# Patient Record
Sex: MALE | Race: WHITE | NOT HISPANIC OR LATINO | Employment: OTHER | RURAL
[De-identification: names, ages, dates, MRNs, and addresses within clinical notes are randomized per-mention and may not be internally consistent; named-entity substitution may affect disease eponyms.]

---

## 2021-10-09 ENCOUNTER — HOSPITAL ENCOUNTER (EMERGENCY)
Facility: HOSPITAL | Age: 76
Discharge: HOME | End: 2021-10-09
Attending: FAMILY MEDICINE
Payer: MEDICARE

## 2021-10-09 VITALS
HEIGHT: 72 IN | RESPIRATION RATE: 18 BRPM | WEIGHT: 211 LBS | SYSTOLIC BLOOD PRESSURE: 162 MMHG | HEART RATE: 58 BPM | TEMPERATURE: 98 F | BODY MASS INDEX: 28.58 KG/M2 | DIASTOLIC BLOOD PRESSURE: 96 MMHG | OXYGEN SATURATION: 97 %

## 2021-10-09 DIAGNOSIS — S81.811A LACERATION OF RIGHT LOWER EXTREMITY, INITIAL ENCOUNTER: Primary | ICD-10-CM

## 2021-10-09 PROCEDURE — 90715 TDAP VACCINE 7 YRS/> IM: CPT | Performed by: FAMILY MEDICINE

## 2021-10-09 PROCEDURE — 63600175 PHARM REV CODE 636 W HCPCS: Performed by: FAMILY MEDICINE

## 2021-10-09 PROCEDURE — 90471 IMMUNIZATION ADMIN: CPT | Performed by: FAMILY MEDICINE

## 2021-10-09 PROCEDURE — 25000003 PHARM REV CODE 250: Performed by: FAMILY MEDICINE

## 2021-10-09 PROCEDURE — 99284 EMERGENCY DEPT VISIT MOD MDM: CPT | Mod: 25

## 2021-10-09 PROCEDURE — 12004 PR RESUPERF WND BODY 7.6-12.5 CM: ICD-10-PCS | Mod: ,,, | Performed by: FAMILY MEDICINE

## 2021-10-09 PROCEDURE — 12004 RPR S/N/AX/GEN/TRK7.6-12.5CM: CPT | Mod: ,,, | Performed by: FAMILY MEDICINE

## 2021-10-09 PROCEDURE — 99283 PR EMERGENCY DEPT VISIT,LEVEL III: ICD-10-PCS | Mod: 25,,, | Performed by: FAMILY MEDICINE

## 2021-10-09 PROCEDURE — 99283 EMERGENCY DEPT VISIT LOW MDM: CPT | Mod: 25,,, | Performed by: FAMILY MEDICINE

## 2021-10-09 PROCEDURE — 25000003 PHARM REV CODE 250

## 2021-10-09 PROCEDURE — 12034 INTMD RPR S/TR/EXT 7.6-12.5: CPT

## 2021-10-09 RX ORDER — LIDOCAINE HYDROCHLORIDE 10 MG/ML
INJECTION INFILTRATION; PERINEURAL
Status: COMPLETED
Start: 2021-10-09 | End: 2021-10-09

## 2021-10-09 RX ORDER — LIDOCAINE HYDROCHLORIDE AND EPINEPHRINE 10; 10 MG/ML; UG/ML
20 INJECTION, SOLUTION INFILTRATION; PERINEURAL ONCE
Status: DISCONTINUED | OUTPATIENT
Start: 2021-10-09 | End: 2021-10-09

## 2021-10-09 RX ORDER — LIDOCAINE HYDROCHLORIDE 10 MG/ML
5 INJECTION, SOLUTION EPIDURAL; INFILTRATION; INTRACAUDAL; PERINEURAL
Status: DISCONTINUED | OUTPATIENT
Start: 2021-10-09 | End: 2021-10-09 | Stop reason: HOSPADM

## 2021-10-09 RX ORDER — CEPHALEXIN 500 MG/1
500 CAPSULE ORAL EVERY 12 HOURS
Qty: 14 CAPSULE | Refills: 0 | Status: SHIPPED | OUTPATIENT
Start: 2021-10-09 | End: 2021-10-18

## 2021-10-09 RX ADMIN — Medication 1 EACH: at 05:10

## 2021-10-09 RX ADMIN — TETANUS TOXOID, REDUCED DIPHTHERIA TOXOID AND ACELLULAR PERTUSSIS VACCINE, ADSORBED 0.5 ML: 5; 2.5; 8; 8; 2.5 SUSPENSION INTRAMUSCULAR at 03:10

## 2021-10-09 RX ADMIN — LIDOCAINE HYDROCHLORIDE: 10 INJECTION, SOLUTION INFILTRATION; PERINEURAL at 04:10

## 2023-02-02 ENCOUNTER — OFFICE VISIT (OUTPATIENT)
Dept: DERMATOLOGY | Facility: CLINIC | Age: 78
End: 2023-02-02
Payer: MEDICARE

## 2023-02-02 DIAGNOSIS — L57.0 AK (ACTINIC KERATOSIS): ICD-10-CM

## 2023-02-02 DIAGNOSIS — D48.9 NEOPLASM OF UNCERTAIN BEHAVIOR: ICD-10-CM

## 2023-02-02 DIAGNOSIS — Z85.828 HISTORY OF NONMELANOMA SKIN CANCER: ICD-10-CM

## 2023-02-02 DIAGNOSIS — L57.8 OTHER SKIN CHANGES DUE TO CHRONIC EXPOSURE TO NONIONIZING RADIATION: Primary | ICD-10-CM

## 2023-02-02 DIAGNOSIS — L82.1 SK (SEBORRHEIC KERATOSIS): ICD-10-CM

## 2023-02-02 PROCEDURE — 11102 TANGNTL BX SKIN SINGLE LES: CPT | Mod: ,,, | Performed by: DERMATOLOGY

## 2023-02-02 PROCEDURE — 17000 PR DESTRUCTION(LASER SURGERY,CRYOSURGERY,CHEMOSURGERY),PREMALIGNANT LESIONS,FIRST LESION: ICD-10-PCS | Mod: XS,,, | Performed by: DERMATOLOGY

## 2023-02-02 PROCEDURE — 88305 TISSUE EXAM BY PATHOLOGIST: CPT | Mod: TC,SUR | Performed by: DERMATOLOGY

## 2023-02-02 PROCEDURE — 11103 TANGNTL BX SKIN EA SEP/ADDL: CPT | Mod: ,,, | Performed by: DERMATOLOGY

## 2023-02-02 PROCEDURE — 88305 PATHOLOGY, DERMATOLOGY: ICD-10-PCS | Mod: 26,,, | Performed by: PATHOLOGY

## 2023-02-02 PROCEDURE — 17000 DESTRUCT PREMALG LESION: CPT | Mod: XS,,, | Performed by: DERMATOLOGY

## 2023-02-02 PROCEDURE — 11102 PR TANGENTIAL BIOPSY, SKIN, SINGLE LESION: ICD-10-PCS | Mod: ,,, | Performed by: DERMATOLOGY

## 2023-02-02 PROCEDURE — 99203 PR OFFICE/OUTPT VISIT, NEW, LEVL III, 30-44 MIN: ICD-10-PCS | Mod: 25,,, | Performed by: DERMATOLOGY

## 2023-02-02 PROCEDURE — 88305 TISSUE EXAM BY PATHOLOGIST: CPT | Mod: 26,,, | Performed by: PATHOLOGY

## 2023-02-02 PROCEDURE — 99203 OFFICE O/P NEW LOW 30 MIN: CPT | Mod: 25,,, | Performed by: DERMATOLOGY

## 2023-02-02 PROCEDURE — 17003 DESTRUCT PREMALG LES 2-14: CPT | Mod: ,,, | Performed by: DERMATOLOGY

## 2023-02-02 PROCEDURE — 17003 DESTRUCTION, PREMALIGNANT LESIONS; SECOND THROUGH 14 LESIONS: ICD-10-PCS | Mod: ,,, | Performed by: DERMATOLOGY

## 2023-02-02 PROCEDURE — 11103 PR TANGENTIAL BIOPSY, SKIN, EA ADDTL LESION: ICD-10-PCS | Mod: ,,, | Performed by: DERMATOLOGY

## 2023-02-02 NOTE — PROGRESS NOTES
[facility]   Marianela Arias MD    Patient Name: Kleber Montoya  Patient YOB: 1945   Date of Service: 2/2/23    CC: Full Skin Exam    HPI: Kleber Montoya is a 77 y.o. male presents today for a full skin exam.  Patient has not been seen by a dermatologist in the past and dermatologic history includes melanoma. Patient is concerned today about a lesion located on the right forearm.  It has been present for 3 month(s). It has not been treated in the past.     History reviewed. No pertinent past medical history.  Past Surgical History:   Procedure Laterality Date    ABDOMINAL SURGERY       Review of patient's allergies indicates:  No Known Allergies  No current outpatient medications on file.    ROS: A focused review of systems was obtained and negative.     Exam: A full skin exam was performed including scalp, hair, face, neck, chest, back, abdomen, right arm, left arm, right hand, left hand, nails, right leg, and left leg.  All areas examined were normal expect as per below in assessment and plan.  General Appearance of the patient is well developed and well nourished.  Orientation: alert and oriented x 3.  Mood and affect: pleasant.    Assessment:   The primary encounter diagnosis was Other skin changes due to chronic exposure to nonionizing radiation. Diagnoses of SK (seborrheic keratosis), AK (actinic keratosis), Neoplasm of uncertain behavior, and History of nonmelanoma skin cancer were also pertinent to this visit.    Plan:        Seborrheic Keratosis (L82.1)  - Stuck-on, warty, greasy brown papule with pseudo-horn cysts scattered on the trunk and extremities    Plan: Counseling.  I counseled the patient regarding the following:  Skin Care: Seborrheic Keratoses are benign. No treatment is necessary.  Expectations: Seborrheic Keratoses are benign warty growths. Patients get more of them as they age    Plan: Reassurance    Actinic Keratoses(L57.0)  - Erythematous patches and papules with  hyperkeratotic scale distributed on the left arm, face, right ear and right arm.    Plan: Counseling.  I counseled the patient regarding the following:  Skin Care: Sun protective clothing and broad spectrum sunscreen can prevent the formation of Actinic  Keratoses. AKs can resolve with cryotherapy, photodynamic therapy, imiquimod, topical 5-FU.  Expectations: Actinic Keratoses are precancerous proliferations that occur within sun damaged skin. If untreated,  a small subset of AKs can develop into Squamous Cell Carcinoma.  Contact Office if: If AKs fail to resolve despite treatment, or if you develop a side effect from therapy, such as  unbearable crusting, scabbing, redness and tenderness.    Plan: Liquid Nitrogen.  A total of 13 lesions were treated with liquid nitrogen for 2 freeze-thaw cycles lasting 5 seconds, located on the above locations.   The patient's consent was obtained including but not limited to risks of crusting, scabbing,  blistering, scarring, darker or lighter pigmentary change, recurrence, incomplete removal and infection.    Neoplasm of Uncertain Behavior (D48.5)  - Pink nodule located on the right ulnar wrist  Ddx includes: SCC    Plan: Biopsy by Shave Method.  Location (1): Right ulnar wrist  Written consent was obtained and risks were reviewed including but not  limited to scarring, infection, bleeding, scabbing, incomplete removal, nerve damage and allergy to anesthesia.  The area was prepped with Chloraprep. Local anesthesia was obtained with approximately 0.5cc of 1% lidocaine  with epinephrine. A biopsy by shave method to the level of the dermis (sent for H and E) was performed using  a Dermablade on the above location. Aluminum chloride was used for hemostasis. Following the biopsy  Petrolatum and a bandage were applied. Patient will be notified of biopsy results. However, patient instructed to  call the office if not contacted within 2 weeks.    - Pink nodule located on the right radial  forearm  Ddx includes: SCC    Plan: Biopsy by Shave Method.  Location (2): Right radial forearm  Written consent was obtained and risks were reviewed including but not  limited to scarring, infection, bleeding, scabbing, incomplete removal, nerve damage and allergy to anesthesia.  The area was prepped with Chloraprep. Local anesthesia was obtained with approximately 0.5cc of 1% lidocaine  with epinephrine. A biopsy by shave method to the level of the dermis (sent for H and E) was performed using  a Dermablade on the above location. Aluminum chloride was used for hemostasis. Following the biopsy  Petrolatum and a bandage were applied. Patient will be notified of biopsy results. However, patient instructed to  call the office if not contacted within 2 weeks.    Plan: Counseling.  I counseled the patient regarding the following:  Instructions: Neoplasms of Uncertain Behavior can be observed, biopsied or surgically removed depending on the  level of clinical suspicion.  Instructions: Neoplasms of Uncertain Behavior can be observed, biopsied or surgically removed depending on the  level of clinical suspicion.  Contact Office if: patient develops any new lesions that fail to heal, ulcerate or bleed.    History of non-melanoma skin cancer (Z85.828)  - Well healed scar with NER  Associated diagnosis: Medical surveillance following completed treatment    Plan: Monitoring.      Other Skin Changes Due to Chronic Exposure of Nonionizing Radiation (L57.8)    Plan: Monitoring.     Plan: Sunscreen Recommendations.  I recommended a broad spectrum sunscreen with a SPF of 30 or higher. I explained that SPF 30 sunscreens block approximately 97 percent of the  sun's harmful rays. Sunscreens should be applied at least 15 minutes prior to expected sun exposure and then every 2 hours after that as long as  sun exposure continues. If swimming or exercising sunscreen should be reapplied every 45 minutes to an hour after getting wet or  sweating. One  ounce, or the equivalent of a shot glass full of sunscreen, is adequate to protect the skin not covered by a bathing suit. I also recommended a lip  balm with a sunscreen as well. Sun protective clothing can be used in lieu of sunscreen but must be worn the entire time you are exposed to the  sun's rays.      Follow up in about 6 months (around 8/2/2023) for FSE.    Marianela Arias MD

## 2023-02-06 LAB
ESTROGEN SERPL-MCNC: NORMAL PG/ML
INSULIN SERPL-ACNC: NORMAL U[IU]/ML
LAB AP GROSS DESCRIPTION: NORMAL
LAB AP LABORATORY NOTES: NORMAL
LAB AP SPEC A DDX: NORMAL
LAB AP SPEC A MORPHOLOGY: NORMAL
LAB AP SPEC A PROCEDURE: NORMAL
LAB AP SPEC B DDX: NORMAL
LAB AP SPEC B MORPHOLOGY: NORMAL
LAB AP SPEC B PROCEDURE: NORMAL
T3RU NFR SERPL: NORMAL %

## 2023-02-17 NOTE — PATIENT INSTRUCTIONS
Excision Site Care  Starting tomorrow you may shower and wash the area with dial antibacterial soap  Pat dry and apply vaseline and a bandaid  Perform this routine for three days   May leave unbandaged after these three days  The area will be irritated, sore, slightly red, and may itch, sting, or burn  Do not apply make-up to the area until it is healed  There will be a scar  The area will take 1-2 weeks to heal  No swimming or soaking in the tub or hot tub until stitches are removed  Tylenol is primarily recommended for pain  If pain is uncontrolled with Tylenol then patient may use Advil, Ibuprofen, Aleve, Motrin, or Aspirin

## 2023-02-21 ENCOUNTER — PROCEDURE VISIT (OUTPATIENT)
Dept: DERMATOLOGY | Facility: CLINIC | Age: 78
End: 2023-02-21
Payer: MEDICARE

## 2023-02-21 VITALS
SYSTOLIC BLOOD PRESSURE: 134 MMHG | HEIGHT: 72 IN | DIASTOLIC BLOOD PRESSURE: 78 MMHG | HEART RATE: 70 BPM | BODY MASS INDEX: 28.58 KG/M2 | WEIGHT: 211 LBS

## 2023-02-21 DIAGNOSIS — C44.92 SCC (SQUAMOUS CELL CARCINOMA): Primary | ICD-10-CM

## 2023-02-21 PROCEDURE — 88305 TISSUE EXAM BY PATHOLOGIST: CPT | Mod: 26,,, | Performed by: PATHOLOGY

## 2023-02-21 PROCEDURE — 11602 EXC TR-EXT MAL+MARG 1.1-2 CM: CPT | Mod: ,,, | Performed by: DERMATOLOGY

## 2023-02-21 PROCEDURE — 11602 PR EXC SKIN MALIG 1.1-2 CM TRUNK,ARM,LEG: ICD-10-PCS | Mod: ,,, | Performed by: DERMATOLOGY

## 2023-02-21 PROCEDURE — 88305 PATHOLOGY, DERMATOLOGY: ICD-10-PCS | Mod: 26,,, | Performed by: PATHOLOGY

## 2023-02-21 PROCEDURE — 12034 PR LAYR CLOS WND TRUNK,ARM,LEG 7.6-12.5 CM: ICD-10-PCS | Mod: 51,,, | Performed by: DERMATOLOGY

## 2023-02-21 PROCEDURE — 99499 UNLISTED E&M SERVICE: CPT | Mod: ,,, | Performed by: DERMATOLOGY

## 2023-02-21 PROCEDURE — 11603 EXC TR-EXT MAL+MARG 2.1-3 CM: CPT | Mod: ,,, | Performed by: DERMATOLOGY

## 2023-02-21 PROCEDURE — 12034 INTMD RPR S/TR/EXT 7.6-12.5: CPT | Mod: 51,,, | Performed by: DERMATOLOGY

## 2023-02-21 PROCEDURE — 11603 PR EXC SKIN MALIG 2.1-3 CM TRUNK,ARM,LEG: ICD-10-PCS | Mod: ,,, | Performed by: DERMATOLOGY

## 2023-02-21 PROCEDURE — 88305 TISSUE EXAM BY PATHOLOGIST: CPT | Mod: TC,59,SUR | Performed by: DERMATOLOGY

## 2023-02-21 PROCEDURE — 99499 NO LOS: ICD-10-PCS | Mod: ,,, | Performed by: DERMATOLOGY

## 2023-02-21 NOTE — PROGRESS NOTES
[facility]   Marianela Arias MD    Patient Name: Kleber Montoya  Patient YOB: 1945   Date of Service: 2/21/23    CC: Excision    HPI: Kleber Montoya is a 77 y.o. male here today for excision of SCC, located on the right ulnar wrist and right radial forearm.      History reviewed. No pertinent past medical history.  Past Surgical History:   Procedure Laterality Date    ABDOMINAL SURGERY       Review of patient's allergies indicates:  No Known Allergies  No current outpatient medications on file.    ROS: A focused review of systems was obtained and negative.     Exam: A focused skin exam was performed and the biopsy site was identified.  General Appearance of the patient is well developed and well nourished.  Orientation: alert and oriented x 3.  Mood and affect: pleasant.    Vitals:    02/21/23 0812   BP: 134/78   Pulse: 70       Assessment:   The encounter diagnosis was SCC (squamous cell carcinoma).    Plan:  Excision  Accession Number: [id number]  Biopsy Photograph Reviewed: Yes    Procedure Note    Location (A): right ulnar wrist  Preop Size: 1.4 x 1.4 cm  Margin: 0.4 cm  Total Excised Diameter: 2.2 cm x 2.2 cm  Procedure: Excision - Elliptical  Anesthesia: local infiltration-1% lidocaine with epinephrine (12 cc)  Estimated Blood Loss: minimal  Complications: none    Repair Type: Intermediate  Repair Length: 4.5 cm    Consent was obtained from the patient. The risks and benefits to therapy were discussed in detail. Specifically, the risks of infection, scarring, bleeding, prolonged wound healing, incomplete removal, allergy to anesthesia, nerve injury and recurrence were addressed. Prior to the procedure, the treatment site was clearly identified and confirmed by the patient. All components of Universal Protocol/PAUSE Rule completed.    Procedure: The patient was placed in a comfortable position exposing the surgical site. The area was prepped with Hibiclens and draped in the usual fashion.  An elliptical excision was performed, on the above listed location The margin was drawn around the clinically apparent lesion. An elliptical shape was then drawn on the skin incorporating the lesion and margins. Incisions were then made along these lines to the appropriate tissue plane and the lesion was extirpated. A 15 blade was used. The lesion was excised to the layer of the adipose tissue, removed and sent to pathology for processing and histologic evaluation.    Intermediate layered repair was performed because closure of the subcutaneous tissue and superficial non-muscular fascia was required, because damaged skin surrounding defect made closure difficult, because extensive undermining required, and because Burow's triangles were required. Undermining was performed with blunt dissection. Hemostasis was achieved with electrocautery. The subcutaneous tissue and dermis were closed with 3-0 Vicryl (simple interrupted). Epidermal closure was achieved with 4-0 Ethilon(simple interrupted). Petrolatum + dry sterile dressing were applied. I reviewed with the patient in detail post-care instructions. Patient is not to engage in any heavy lifting, exercise, or swimming for the next 14 days. Should the patient develop any fevers, chills, bleeding, severe pain patient will contact the office immediately. Suture removal in 10.    Location (B): right radial forearm  Preop Size: 1.1 cm x 1.2 cm  Margin: 0.4 cm  Total Excised Diameter: 1.9 x 2.0 cm  Procedure: Excision - Elliptical  Anesthesia: local infiltration-1% lidocaine with epinephrine (12 cc)  Estimated Blood Loss: minimal  Complications: none    Repair Type: Intermediate  Repair Length: 4.5 cm    Consent was obtained from the patient. The risks and benefits to therapy were discussed in detail. Specifically, the risks of infection, scarring, bleeding, prolonged wound healing, incomplete removal, allergy to anesthesia, nerve injury and recurrence were addressed. Prior  to the procedure, the treatment site was clearly identified and confirmed by the patient. All components of Universal Protocol/PAUSE Rule completed.    Procedure: The patient was placed in a comfortable position exposing the surgical site. The area was prepped with Hibiclens and draped in the usual fashion. An elliptical excision was performed, on the above listed location The margin was drawn around the clinically apparent lesion. An elliptical shape was then drawn on the skin incorporating the lesion and margins. Incisions were then made along these lines to the appropriate tissue plane and the lesion was extirpated. A 15 blade was used. The lesion was excised to the layer of the adipose tissue, removed and sent to pathology for processing and histologic evaluation.    Intermediate layered repair was performed because closure of the subcutaneous tissue and superficial non-muscular fascia was required, because damaged skin surrounding defect made closure difficult, because extensive undermining required, and because Burow's triangles were required. Undermining was performed with blunt dissection. Hemostasis was achieved with electrocautery. The subcutaneous tissue and dermis were closed with 3-0 Vicryl (simple interrupted). Epidermal closure was achieved with 4-0 Vicryl(simple interrupted). Petrolatum + dry sterile dressing were applied. I reviewed with the patient in detail post-care instructions. Patient is not to engage in any heavy lifting, exercise, or swimming for the next 14 days. Should the patient develop any fevers, chills, bleeding, severe pain patient will contact the office immediately. Suture removal in 10.         Follow up in about 10 days (around 3/3/2023) for Suture Removal.    Marianela Arias MD

## 2023-03-03 ENCOUNTER — CLINICAL SUPPORT (OUTPATIENT)
Dept: DERMATOLOGY | Facility: CLINIC | Age: 78
End: 2023-03-03
Payer: MEDICARE

## 2023-03-03 DIAGNOSIS — Z48.02 ENCOUNTER FOR REMOVAL OF SUTURES: Primary | ICD-10-CM

## 2023-03-03 PROCEDURE — 87077 CULTURE, WOUND: ICD-10-PCS | Mod: ,,, | Performed by: CLINICAL MEDICAL LABORATORY

## 2023-03-03 PROCEDURE — 87070 CULTURE, WOUND: ICD-10-PCS | Mod: ,,, | Performed by: CLINICAL MEDICAL LABORATORY

## 2023-03-03 PROCEDURE — 87070 CULTURE OTHR SPECIMN AEROBIC: CPT | Mod: ,,, | Performed by: CLINICAL MEDICAL LABORATORY

## 2023-03-03 PROCEDURE — 87077 CULTURE AEROBIC IDENTIFY: CPT | Mod: ,,, | Performed by: CLINICAL MEDICAL LABORATORY

## 2023-03-03 NOTE — PROGRESS NOTES
[facility]   Marianela Arias MD    Patient Name: Kleber Montoya  Patient YOB: 1945   Date of Service: 3/3/23    CC: Suture removal    HPI: Kleber Montoya is a 77 y.o. male here today for suture removal on the right ulnar wrist and right radial forearm.  The area is healing well.  Patient denies fever, chills, puss, or redness to the area.    No past medical history on file.  Past Surgical History:   Procedure Laterality Date    ABDOMINAL SURGERY       Review of patient's allergies indicates:  No Known Allergies  No current outpatient medications on file.      Exam: A focused skin exam was performed. All areas examined were normal except as mentioned in the assessment and plan below.  General Appearance of the patient is well developed and well nourished.  Orientation: alert and oriented x 3.  Mood and affect: pleasant.    Assessment:   There were no encounter diagnoses.    Plan:   Suture Removal (Global Period)  Body Locations: right ulnar wrist and right radial forearm  I reviewed the pathology results with the patient in detail.  The examination of the site was clean, dry and intact. Sutures were removed.  Vaseline applied.    I counseled the patient regarding the following:  Expectations: Sutured wounds tend to have 50% of the strength of normal skin at the time of suture removal. Try avoiding rigorous exercise or lifting greater than 10 pounds.  Contact office if: you develop pain, redness, tenderness or pus at the surgical site.    A culture was obtained from the area           Follow up if symptoms worsen or fail to improve, for FSE already scheduled.    Radha Moffett RN

## 2023-03-05 LAB — MICROORGANISM SPEC CULT: ABNORMAL

## 2023-03-06 DIAGNOSIS — L08.9 SKIN INFECTION: Primary | ICD-10-CM

## 2023-03-06 RX ORDER — CEPHALEXIN 500 MG/1
500 CAPSULE ORAL 3 TIMES DAILY
Qty: 21 CAPSULE | Refills: 0 | Status: SHIPPED | OUTPATIENT
Start: 2023-03-06 | End: 2023-03-13

## 2023-08-02 ENCOUNTER — OFFICE VISIT (OUTPATIENT)
Dept: DERMATOLOGY | Facility: CLINIC | Age: 78
End: 2023-08-02
Payer: MEDICARE

## 2023-08-02 DIAGNOSIS — L82.1 SK (SEBORRHEIC KERATOSIS): ICD-10-CM

## 2023-08-02 DIAGNOSIS — Z85.820 HISTORY OF MELANOMA: ICD-10-CM

## 2023-08-02 DIAGNOSIS — L57.0 AK (ACTINIC KERATOSIS): ICD-10-CM

## 2023-08-02 DIAGNOSIS — D48.9 NEOPLASM OF UNCERTAIN BEHAVIOR: ICD-10-CM

## 2023-08-02 DIAGNOSIS — L57.8 OTHER SKIN CHANGES DUE TO CHRONIC EXPOSURE TO NONIONIZING RADIATION: Primary | ICD-10-CM

## 2023-08-02 DIAGNOSIS — Z85.828 HISTORY OF NONMELANOMA SKIN CANCER: ICD-10-CM

## 2023-08-02 PROCEDURE — 11102 PR TANGENTIAL BIOPSY, SKIN, SINGLE LESION: ICD-10-PCS | Mod: ,,, | Performed by: DERMATOLOGY

## 2023-08-02 PROCEDURE — 11102 TANGNTL BX SKIN SINGLE LES: CPT | Mod: ,,, | Performed by: DERMATOLOGY

## 2023-08-02 PROCEDURE — 99213 OFFICE O/P EST LOW 20 MIN: CPT | Mod: 25,,, | Performed by: DERMATOLOGY

## 2023-08-02 PROCEDURE — 17003 DESTRUCTION, PREMALIGNANT LESIONS; SECOND THROUGH 14 LESIONS: ICD-10-PCS | Mod: ,,, | Performed by: DERMATOLOGY

## 2023-08-02 PROCEDURE — 17000 DESTRUCT PREMALG LESION: CPT | Mod: XS,,, | Performed by: DERMATOLOGY

## 2023-08-02 PROCEDURE — 88305 TISSUE EXAM BY PATHOLOGIST: CPT | Mod: TC,SUR | Performed by: DERMATOLOGY

## 2023-08-02 PROCEDURE — 88305 PATHOLOGY, DERMATOLOGY: ICD-10-PCS | Mod: 26,,, | Performed by: PATHOLOGY

## 2023-08-02 PROCEDURE — 17000 PR DESTRUCTION(LASER SURGERY,CRYOSURGERY,CHEMOSURGERY),PREMALIGNANT LESIONS,FIRST LESION: ICD-10-PCS | Mod: XS,,, | Performed by: DERMATOLOGY

## 2023-08-02 PROCEDURE — 17003 DESTRUCT PREMALG LES 2-14: CPT | Mod: ,,, | Performed by: DERMATOLOGY

## 2023-08-02 PROCEDURE — 88305 TISSUE EXAM BY PATHOLOGIST: CPT | Mod: 26,,, | Performed by: PATHOLOGY

## 2023-08-02 PROCEDURE — 99213 PR OFFICE/OUTPT VISIT, EST, LEVL III, 20-29 MIN: ICD-10-PCS | Mod: 25,,, | Performed by: DERMATOLOGY

## 2023-08-02 NOTE — PROGRESS NOTES
[facility]   Marianela Arias MD    Patient Name: Kleber Montoya  Patient YOB: 1945   Date of Service: 8/2/23    CC: Full Skin Exam    HPI: Kleber Montoya is a 78 y.o. male presents today for a full skin exam.  Patient was last seen 02/23 and dermatologic history includes Melanoma, NMSC, and AK. Patient is concerned today about a lesion located on the nasal tip.  It has been present for 3 month(s). It has not been treated in the past.      History reviewed. No pertinent past medical history.  Past Surgical History:   Procedure Laterality Date    ABDOMINAL SURGERY       Review of patient's allergies indicates:  No Known Allergies  No current outpatient medications on file.    ROS: A focused review of systems was obtained and negative.     Exam: A full skin exam was performed including scalp, hair, face, neck, chest, back, abdomen, right arm, left arm, right hand, left hand, nails, right leg, and left leg.  All areas examined were normal expect as per below in assessment and plan.  General Appearance of the patient is well developed and well nourished.  Orientation: alert and oriented x 3.  Mood and affect: pleasant.    Assessment:   The primary encounter diagnosis was Other skin changes due to chronic exposure to nonionizing radiation. Diagnoses of SK (seborrheic keratosis), AK (actinic keratosis), Neoplasm of uncertain behavior, History of nonmelanoma skin cancer, and History of melanoma were also pertinent to this visit.    Plan:        Seborrheic Keratosis (L82.1)  - Stuck-on, warty, greasy brown papule with pseudo-horn cysts scattered on the trunk and extremities    Plan: Counseling.  I counseled the patient regarding the following:  Skin Care: Seborrheic Keratoses are benign. No treatment is necessary.  Expectations: Seborrheic Keratoses are benign warty growths. Patients get more of them as they age    Plan: Reassurance      Actinic Keratoses(L57.0)  - Erythematous patches and papules with  hyperkeratotic scale distributed on the right dorsal hand and right arm.    Plan: Counseling.  I counseled the patient regarding the following:  Skin Care: Sun protective clothing and broad spectrum sunscreen can prevent the formation of Actinic  Keratoses. AKs can resolve with cryotherapy, photodynamic therapy, imiquimod, topical 5-FU.  Expectations: Actinic Keratoses are precancerous proliferations that occur within sun damaged skin. If untreated,  a small subset of AKs can develop into Squamous Cell Carcinoma.  Contact Office if: If AKs fail to resolve despite treatment, or if you develop a side effect from therapy, such as  unbearable crusting, scabbing, redness and tenderness.    Plan: Liquid Nitrogen.  A total of 4 lesions were treated with liquid nitrogen for 2 freeze-thaw cycles lasting 5 seconds, located on the above locations.   The patient's consent was obtained including but not limited to risks of crusting, scabbing,  blistering, scarring, darker or lighter pigmentary change, recurrence, incomplete removal and infection.    Neoplasm of Uncertain Behavior (D48.5)  - Pearly papule located on the right nasal tip  Ddx includes: BCC    Plan: Counseling.  I counseled the patient regarding the following:  Instructions: Neoplasms of Uncertain Behavior can be observed, biopsied or surgically removed depending on the  level of clinical suspicion.  Instructions: Neoplasms of Uncertain Behavior can be observed, biopsied or surgically removed depending on the  level of clinical suspicion.  Contact Office if: patient develops any new lesions that fail to heal, ulcerate or bleed.    Plan: Biopsy by Shave Method.  Location (1): Right nasal tip  Written consent was obtained and risks were reviewed including but not  limited to scarring, infection, bleeding, scabbing, incomplete removal, nerve damage and allergy to anesthesia.  The area was prepped with Chloraprep. Local anesthesia was obtained with approximately 0.5cc  of 1% lidocaine  with epinephrine. A biopsy by shave method to the level of the dermis (sent for H and E) was performed using  a Dermablade on the above location. Aluminum chloride was used for hemostasis. Following the biopsy  Petrolatum and a bandage were applied. Patient will be notified of biopsy results. However, patient instructed to  call the office if not contacted within 2 weeks.      History of non-melanoma skin cancer (Z85.828)  - Well healed scar with NER  Associated diagnosis: Medical surveillance following completed treatment    Plan: Monitoring.    History of malignant melanoma  - well healed scar with NER  Associated diagnosis: Medical surveillance following completed treatment    Plan: Counseling  I counseled the patient regarding the following:  Skin Care: Patients with a history of melanoma should wear broad spectrum sunscreen and sun protective clothing.  Expectations: Scars from excisional sites of melanoma should be monitored for any recurrences. Monthly self-skin checks should be performed to monitor for any moles that change in size, shape or color, itch burn or bleed.  Contact Office if: Patient notices any new or changing moles, develops constitutional symptoms or develops new lesions within or around the previous melanoma scar.      Other Skin Changes Due to Chronic Exposure of Nonionizing Radiation (L57.8)    Plan: Monitoring.     Plan: Sunscreen Recommendations.  I recommended a broad spectrum sunscreen with a SPF of 30 or higher. I explained that SPF 30 sunscreens block approximately 97 percent of the  sun's harmful rays. Sunscreens should be applied at least 15 minutes prior to expected sun exposure and then every 2 hours after that as long as  sun exposure continues. If swimming or exercising sunscreen should be reapplied every 45 minutes to an hour after getting wet or sweating. One  ounce, or the equivalent of a shot glass full of sunscreen, is adequate to protect the skin not  covered by a bathing suit. I also recommended a lip  balm with a sunscreen as well. Sun protective clothing can be used in lieu of sunscreen but must be worn the entire time you are exposed to the  sun's rays.      Follow up in about 6 months (around 2/2/2024) for FSE.    Marianela Arias MD

## 2023-08-02 NOTE — PATIENT INSTRUCTIONS
Sunscreen Recommendations  I recommended a broad spectrum sunscreen with a SPF of 30 or higher that is water-resistant. SPF 30 sunscreens block approximately 97 percent of the sun's harmful rays.   Sunscreens should be applied at least 15 minutes prior to expected sun exposure and then every 90 minutes after that as long as sun exposure continues.   If swimming or exercising sunscreen should be reapplied every 45 minutes to an hour after getting wet or sweating.  One ounce, or the equivalent of a shot glass full of sunscreen, is adequate to protect the skin not covered by a bathing suit.   I also recommended a lip balm with a sunscreen as well.   Healthy Sun Protective Behaviors  Sun protective clothing can be used in lieu of sunscreen but must be worn the entire time you are exposed to the sun's rays.  Seek shade between 10 a.m. and 2 p.m.  Use extra caution near water, snow, or sand as they reflect sun rays  Avoid tanning beds and consider sunless self-tanning products instead  Perform monthly self skin exams       Cryotherapy  There will likely be a blister.   Clean the area daily with dial antibacterial soap and water.   Apply vaseline as needed.   The area will take 1-2 weeks to heal.      Biopsy Site Care  Starting tomorrow you may shower and wash the area with dial antibacterial soap  Pat dry and apply vaseline and a bandaid  Perform this routine for three days  The area will be irritated, sore, slightly red, and may itch, sting, or burn  Do not apply make-up to the area until it is healed  There will be a scar  The area will take 1-2 weeks to heal  No soaking in the tub or hot tub for one week

## 2023-08-04 LAB
ESTROGEN SERPL-MCNC: NORMAL PG/ML
INSULIN SERPL-ACNC: NORMAL U[IU]/ML
LAB AP GROSS DESCRIPTION: NORMAL
LAB AP LABORATORY NOTES: NORMAL
LAB AP SPEC A DDX: NORMAL
LAB AP SPEC A MORPHOLOGY: NORMAL
LAB AP SPEC A PROCEDURE: NORMAL
T3RU NFR SERPL: NORMAL %

## 2023-08-30 ENCOUNTER — PROCEDURE VISIT (OUTPATIENT)
Dept: DERMATOLOGY | Facility: CLINIC | Age: 78
End: 2023-08-30
Payer: MEDICARE

## 2023-08-30 VITALS — DIASTOLIC BLOOD PRESSURE: 74 MMHG | SYSTOLIC BLOOD PRESSURE: 131 MMHG | HEART RATE: 65 BPM

## 2023-08-30 DIAGNOSIS — C44.311 BASAL CELL CARCINOMA (BCC) OF RIGHT NASAL TIP: Primary | ICD-10-CM

## 2023-08-30 PROCEDURE — 13152 CMPLX RPR E/N/E/L 2.6-7.5 CM: CPT | Mod: 51,,, | Performed by: STUDENT IN AN ORGANIZED HEALTH CARE EDUCATION/TRAINING PROGRAM

## 2023-08-30 PROCEDURE — 17311 MOHS 1 STAGE H/N/HF/G: CPT | Mod: ,,, | Performed by: STUDENT IN AN ORGANIZED HEALTH CARE EDUCATION/TRAINING PROGRAM

## 2023-08-30 PROCEDURE — 99499 NO LOS: ICD-10-PCS | Mod: ,,, | Performed by: STUDENT IN AN ORGANIZED HEALTH CARE EDUCATION/TRAINING PROGRAM

## 2023-08-30 PROCEDURE — 99499 UNLISTED E&M SERVICE: CPT | Mod: ,,, | Performed by: STUDENT IN AN ORGANIZED HEALTH CARE EDUCATION/TRAINING PROGRAM

## 2023-08-30 PROCEDURE — 13152 PR RECMPL WND LID,NOS,EAR 2.6-7.5 CM: ICD-10-PCS | Mod: 51,,, | Performed by: STUDENT IN AN ORGANIZED HEALTH CARE EDUCATION/TRAINING PROGRAM

## 2023-08-30 PROCEDURE — 17311: ICD-10-PCS | Mod: ,,, | Performed by: STUDENT IN AN ORGANIZED HEALTH CARE EDUCATION/TRAINING PROGRAM

## 2023-08-30 RX ORDER — MUPIROCIN 20 MG/G
OINTMENT TOPICAL DAILY
Qty: 22 G | Refills: 5 | Status: SHIPPED | OUTPATIENT
Start: 2023-08-30

## 2023-08-30 NOTE — PATIENT INSTRUCTIONS

## 2023-08-30 NOTE — PROGRESS NOTES
Mohs Surgery Consult Note    Kleber Montoya is a 78 y.o. male who is referred by Dr. Arias for evaluation of a BCC on the right nasal tip.     Recurrent skin cancer: No    Preoperative Risk Factors:  Current Anticoagulants: No  Endocarditis / Rheumatic Fever hx: No  Immunocompromised: No  Prosthetic joint: No  Congenital heart defect: No  Prosthetic heart valve: No  Diabetic: No  Transplant: No  Pacemaker: No  Defibrillator:  No  Prior problem with local anesthesia: No  Tobacco History: No]  Clindamycin Allergy: No  Pregnant: no     Transmissible Diseases:  HIV No  Hepatitis B or C  No      Exam:  Limited skin exam is normal except for a  BCC  located on the right nasal tip  .    Pathologic Findings:  Accession # [id number]  Diagnosis: BCC    Assessment and Plan:  Treatment Options : The various treatment options for skin cancer removal were reviewed with the patient in detail. These include Mohs surgery with its high cure rate, excisional surgery, destructive treatment, radiation therapy, and various topical therapies.  Given the indications and high cure rate, the patient has agreed to proceed with Mohs.  Risks and Benefits : The rationale for Mohs was explained to the patient. The risks and benefits to therapy were discussed in detail. Specifically, the risks of infection, scarring, bleeding, dehiscence, hematoma, prolonged wound healing, incomplete removal, allergy to anesthesia, nerve injury, inability to clear the tumor, and recurrence were addressed. The treatment site was clearly identified and confirmed by the patient.    Plan:  Mohs Micrographic Surgery    Indication for Mohs: Clinical area critical for tissue conservation (Area H: central face, eyelids, eyebrows, nose, lips, chin, ear, periauricular, temple, genitalia, hands, feet, ankles, nail units and areola)  Mohs AUC Score: 9   Proposed closure: Linear   Indication for antibiotics: Mupirocin ointment bid x 7 days       Leif Bowers MD   Mohs  Surgery/Dermatologic Oncology

## 2023-08-30 NOTE — PROGRESS NOTES
Mohs Surgery Operative Note    Patient name: Kleber Montoya  Date: 08/30/2023    Mohs accession #:   Previous dermpath accession #: [id number]  Location: Right nasal tip  Preop diagnosis:BCC  Postop diagnosis: Same  Mohs AUC score: 9  Number of stages: 1  Preop size: 0.5 x 0.5 cm   Postop size: 0.9 x 0.9 cm   Depth of defect: fat  Repair type: complex    Surgeon and Pathologist: MARCO A Bowers MD     Indications for Mohs Surgery    Removal of the patient's tumor is complicated by the following clinical features: Clinical area critical for tissue conservation (Area H: central face, eyelids, eyebrows, nose, lips, chin, ear, periauricular, temple, genitalia, hands, feet, ankles, nail units and areola) and Poorly-defined clinical tumor borders    Based on my medical judgement, Mohs surgery is the most appropriate treatment for this cancer compared to other treatments. I discussed alternative treatments to Mohs surgery and specifically discussed the risks and benefits of curettage, excision with permanent sections, and foregoing treatment. The rationale for Mohs was explained to the patient and consent was obtained. The risks, benefits and alternatives to therapy were discussed in detail. Specifically, the risks of infection, scarring, bleeding, prolonged wound healing, incomplete removal, allergy to anesthesia, nerve injury and recurrence were addressed. Prior to the procedure, the treatment site was clearly identified and confirmed by the patient. All components of Universal Protocol/PAUSE Rule completed. ELVIS Bowers MD operated in two distinct and integrated capacities as the surgeon and pathologist.    STAGE I:  The patient was placed on the operating table. The cancer was identified and outlined. The entire surgical field was prepped with chlorhexidine The surgical site was anesthetized using Lidocaine 1% with epinephrine 1:100,000 buffered with sodium bicarbonate 8.4% in a 1:10 ratio.    The area of  clinically apparent tumor was debulked with a 2 mm curette. The layer of tissue was then surgically excised using a #15 blade and was then transferred onto a specimen sheet maintaining the orientation of the specimen. Hemostasis was obtained using monopolar electrodesiccation. The wound site was then covered with a dressing while the tissue samples were processed for examination.    The specimen was oriented, mapped and divided. Each section was then inked and processed in the Mohs lab using the Mohs protocol and submitted for frozen section. The histopathologic sections were reviewed by the surgeon in conjunction with the reference map.     Total blocks: 1 Total slides: 2    Frozen section analysis revealed: No additional tumor identified on microscopic examination by the surgeon. Histology: No malignant cells seen in the sections examined.    Additional Histologic Findings: none     REPAIR: Complex Closure    Primary Surgeon : MARCO A Bowers MD   Repair Size: 2.6 cm  Sutures:  4-0 monocryl; 5-0 prolene   Width of underminin cm   Free margin involved: no  Presence of exposed bone/cartilage/tendon/named neurovascular structure: no  Use of retention sutures: No  Indication for complex repair: Wide undermining at least the width of the defect on one side needed to facilitate a lower tension closure     The defect was identified and a marking pen was used to plan the repair. The area was infiltrated with Lidocaine 1% with epinephrine 1:100,000 buffered with sodium bicarbonate 8.4% in a 1:10 ratio, prepped with chlorhexidine and draped with sterile towels.  The wound was debeveled and undermined widely to the width listed as above. Cones were excised within relaxed skin tension lines on both sides of the defect. Hemostasis was obtained using monopolar electrodesiccation. The dermis and subcutaneous tissue were then approximated using buried vertical mattress sutures. Percutaneous simple running sutures were  carefully placed for maximum eversion and meticulous wound edge approximation. Careful attention was paid to avoid distorting any nearby free margins.    The wound was cleansed with saline and ointment was applied along the wound surface. A sterile pressure dressing was applied. Wound care instructions were given verbally and in writing. The patient left the operating suite in stable condition. Patient was informed that additional refinement of the resulting surgical scar may be used as a second stage of this reconstruction.    Leif Bowers MD   Mohs Surgery, Dermatologic Oncology

## 2023-09-06 ENCOUNTER — CLINICAL SUPPORT (OUTPATIENT)
Dept: DERMATOLOGY | Facility: CLINIC | Age: 78
End: 2023-09-06
Payer: MEDICARE

## 2023-09-06 DIAGNOSIS — Z48.02 VISIT FOR SUTURE REMOVAL: Primary | ICD-10-CM

## 2023-09-06 PROCEDURE — 99024 PR POST-OP FOLLOW-UP VISIT: ICD-10-PCS | Mod: ,,, | Performed by: STUDENT IN AN ORGANIZED HEALTH CARE EDUCATION/TRAINING PROGRAM

## 2023-09-06 PROCEDURE — 99024 POSTOP FOLLOW-UP VISIT: CPT | Mod: ,,, | Performed by: STUDENT IN AN ORGANIZED HEALTH CARE EDUCATION/TRAINING PROGRAM

## 2023-09-06 NOTE — PROGRESS NOTES
Patient name: Kleber Montoay  Date: 08/30/2023     Mohs accession #:   Previous dermpath accession #: [id number]  Location: Right nasal tip  Preop diagnosis:BCC  Postop diagnosis: Same  Mohs AUC score: 9  Number of stages: 1  Preop size: 0.5 x 0.5 cm   Postop size: 0.9 x 0.9 cm   Depth of defect: fat  Repair type: complex    Patient is here for SR incision is healing well no s/s of infection noted patient denies pain will RTC in October for wound check      Avey'On SACHA Caro/IVC

## 2023-10-02 ENCOUNTER — CLINICAL SUPPORT (OUTPATIENT)
Dept: DERMATOLOGY | Facility: CLINIC | Age: 78
End: 2023-10-02
Payer: MEDICARE

## 2023-10-02 DIAGNOSIS — Z51.89 VISIT FOR WOUND CHECK: Primary | ICD-10-CM

## 2023-10-02 NOTE — PROGRESS NOTES
Mohs accession #:   Previous dermpath accession #: [id number]  Location: Right nasal tip  Preop diagnosis:BCC  Postop diagnosis: Same  Mohs AUC score: 9  Number of stages: 1  Preop size: 0.5 x 0.5 cm   Postop size: 0.9 x 0.9 cm   Depth of defect: fat  Repair type: complex    Patient is here for 6 week wound check. Incision is healing well. Will do derma sanding to nasal tip today.    The area was cleaned with hibiclens and locally infiltrated with 1% lidocaine with epinephrine, ~3 cc total. The area was dermabraded with sterilized 3M medium grit drywall sandpaper until pinpoint bleeding occurred.  The patient tolerated the procedure well without complications. Verbal and written wound care instructions were given.     RTC in 7 weeks for wound check.        Nik'On SACHA Caro/IVC

## 2023-11-20 ENCOUNTER — CLINICAL SUPPORT (OUTPATIENT)
Dept: DERMATOLOGY | Facility: CLINIC | Age: 78
End: 2023-11-20
Payer: MEDICARE

## 2023-11-20 DIAGNOSIS — Z51.89 VISIT FOR WOUND CHECK: Primary | ICD-10-CM

## 2023-11-20 NOTE — PROGRESS NOTES
Mohs accession #:   Previous dermpath accession #: [id number]  Location: Right nasal tip  Preop diagnosis:BCC  Postop diagnosis: Same  Mohs AUC score: 9  Number of stages: 1  Preop size: 0.5 x 0.5 cm   Postop size: 0.9 x 0.9 cm   Depth of defect: fat  Repair type: complex    Patient is her for wd ck s/p derma sanding. Scar has healed well. No complaints noted. RTC with Dr. Arias for FSE.         Nik'On SACHA Caro/IVC

## 2024-02-05 ENCOUNTER — OFFICE VISIT (OUTPATIENT)
Dept: DERMATOLOGY | Facility: CLINIC | Age: 79
End: 2024-02-05
Payer: MEDICARE

## 2024-02-05 VITALS — HEIGHT: 72 IN | BODY MASS INDEX: 28.44 KG/M2 | RESPIRATION RATE: 18 BRPM | WEIGHT: 210 LBS

## 2024-02-05 DIAGNOSIS — Z85.828 HISTORY OF BASAL CELL CARCINOMA OF SKIN: ICD-10-CM

## 2024-02-05 DIAGNOSIS — L82.1 SEBORRHEIC KERATOSES: ICD-10-CM

## 2024-02-05 DIAGNOSIS — Z85.820 HISTORY OF MELANOMA: ICD-10-CM

## 2024-02-05 DIAGNOSIS — L57.8 OTHER SKIN CHANGES DUE TO CHRONIC EXPOSURE TO NONIONIZING RADIATION: Primary | ICD-10-CM

## 2024-02-05 DIAGNOSIS — L57.0 ACTINIC KERATOSES: ICD-10-CM

## 2024-02-05 DIAGNOSIS — Z85.828 HISTORY OF SQUAMOUS CELL CARCINOMA OF SKIN: ICD-10-CM

## 2024-02-05 PROCEDURE — 17000 DESTRUCT PREMALG LESION: CPT | Mod: ,,, | Performed by: DERMATOLOGY

## 2024-02-05 PROCEDURE — 17003 DESTRUCT PREMALG LES 2-14: CPT | Mod: ,,, | Performed by: DERMATOLOGY

## 2024-02-05 PROCEDURE — 99213 OFFICE O/P EST LOW 20 MIN: CPT | Mod: 25,,, | Performed by: DERMATOLOGY

## 2024-02-05 NOTE — PROGRESS NOTES
[facility]   Marianela Arias MD    Patient Name: Kleber Montoya  Patient YOB: 1945   Date of Service: 2/5/24    CC: Full Skin Exam    HPI: Kleber Montoya is a 78 y.o. male presents today for a full skin exam.  Patient was last seen 8/2/23 and dermatologic history includes Melanoma, SCC, and BCC. Patient is concerned today about a lesion located on the left helix.  It has been present for 4 month(s). It has not been treated in the past.  Patient is also concerned about lesion located on the left scalp.    History reviewed. No pertinent past medical history.  Past Surgical History:   Procedure Laterality Date    ABDOMINAL SURGERY       Review of patient's allergies indicates:  No Known Allergies    Current Outpatient Medications:     mupirocin (BACTROBAN) 2 % ointment, Apply topically once daily., Disp: 22 g, Rfl: 5    ROS: A focused review of systems was obtained and negative.     Exam: A full skin exam was performed including scalp, hair, face, neck, chest, back, abdomen, right arm, left arm, right hand, left hand, nails, right leg, and left leg.  All areas examined were normal expect as per below in assessment and plan.  General Appearance of the patient is well developed and well nourished.  Orientation: alert and oriented x 3.  Mood and affect: pleasant.    Assessment:   The primary encounter diagnosis was Other skin changes due to chronic exposure to nonionizing radiation. Diagnoses of History of basal cell carcinoma of skin, Seborrheic keratoses, History of squamous cell carcinoma of skin, History of melanoma, and Actinic keratoses were also pertinent to this visit.    Plan:      Seborrheic Keratosis (L82.1)  - Stuck-on, warty, greasy brown papule with pseudo-horn cysts scattered on the trunk and extremities    Plan: Counseling.  I counseled the patient regarding the following:  Skin Care: Seborrheic Keratoses are benign. No treatment is necessary.  Expectations: Seborrheic Keratoses are  benign warty growths. Patients get more of them as they age    Plan: Reassurance    Actinic Keratoses(L57.0)  - Erythematous patches and papules with hyperkeratotic scale distributed on the right forearm, left helix, left hand, and left forearm.    Plan: Counseling.  I counseled the patient regarding the following:  Skin Care: Sun protective clothing and broad spectrum sunscreen can prevent the formation of Actinic  Keratoses. AKs can resolve with cryotherapy, photodynamic therapy, imiquimod, topical 5-FU.  Expectations: Actinic Keratoses are precancerous proliferations that occur within sun damaged skin. If untreated,  a small subset of AKs can develop into Squamous Cell Carcinoma.  Contact Office if: If AKs fail to resolve despite treatment, or if you develop a side effect from therapy, such as  unbearable crusting, scabbing, redness and tenderness.    Plan: Liquid Nitrogen.  A total of 6 lesions were treated with liquid nitrogen for 2 freeze-thaw cycles lasting 5 seconds, located on the above locations.   The patient's consent was obtained including but not limited to risks of crusting, scabbing,  blistering, scarring, darker or lighter pigmentary change, recurrence, incomplete removal and infection.    History of non-melanoma skin cancer (Z85.828)  - Well healed scar with NER  Associated diagnosis: Medical surveillance following completed treatment    Plan: Monitoring.    History of malignant melanoma  - well healed scar with NER  Associated diagnosis: Medical surveillance following completed treatment    Plan: Counseling  I counseled the patient regarding the following:  Skin Care: Patients with a history of melanoma should wear broad spectrum sunscreen and sun protective clothing.  Expectations: Scars from excisional sites of melanoma should be monitored for any recurrences. Monthly self-skin checks should be performed to monitor for any moles that change in size, shape or color, itch burn or bleed.  Contact  Office if: Patient notices any new or changing moles, develops constitutional symptoms or develops new lesions within or around the previous melanoma scar.    Other Skin Changes Due to Chronic Exposure of Nonionizing Radiation (L57.8)    Plan: Monitoring.     Plan: Sunscreen Recommendations.  I recommended a broad spectrum sunscreen with a SPF of 30 or higher. I explained that SPF 30 sunscreens block approximately 97 percent of the  sun's harmful rays. Sunscreens should be applied at least 15 minutes prior to expected sun exposure and then every 2 hours after that as long as  sun exposure continues. If swimming or exercising sunscreen should be reapplied every 45 minutes to an hour after getting wet or sweating. One  ounce, or the equivalent of a shot glass full of sunscreen, is adequate to protect the skin not covered by a bathing suit. I also recommended a lip  balm with a sunscreen as well. Sun protective clothing can be used in lieu of sunscreen but must be worn the entire time you are exposed to the  sun's rays.      Follow up in about 6 months (around 8/5/2024) for SKin Check .    Marianela Arias MD

## 2024-02-05 NOTE — PATIENT INSTRUCTIONS
The ABCDEs of Melanoma  Asymmetry - when one side is unlike the other  Border - irregular  Color - different shades of colors that can be black, brown, tan, white, red, grey, or blue  Diameter - as big as or larger than the size of a pencil eraser (6mm)  Evolving - changing in size, color, or shape or stands out from the rest of your moles  Sunscreen Recommendations  I recommended a broad spectrum sunscreen with a SPF of 30 or higher that is water-resistant. SPF 30 sunscreens block approximately 97 percent of the sun's harmful rays.   Sunscreens should be applied at least 15 minutes prior to expected sun exposure and then every 90 minutes after that as long as sun exposure continues.   If swimming or exercising sunscreen should be reapplied every 45 minutes to an hour after getting wet or sweating.  One ounce, or the equivalent of a shot glass full of sunscreen, is adequate to protect the skin not covered by a bathing suit.   I also recommended a lip balm with a sunscreen as well.   Healthy Sun Protective Behaviors  Sun protective clothing can be used in lieu of sunscreen but must be worn the entire time you are exposed to the sun's rays.  Seek shade between 10 a.m. and 2 p.m.  Use extra caution near water, snow, or sand as they reflect sun rays  Avoid tanning beds and consider sunless self-tanning products instead  Perform monthly self skin exams     Cryotherapy  There will likely be a blister.   Clean the area daily with dial antibacterial soap and water.   Apply vaseline as needed.   The area will take 1-2 weeks to heal.

## 2024-08-14 ENCOUNTER — OFFICE VISIT (OUTPATIENT)
Dept: DERMATOLOGY | Facility: CLINIC | Age: 79
End: 2024-08-14
Payer: MEDICARE

## 2024-08-14 DIAGNOSIS — L82.1 SK (SEBORRHEIC KERATOSIS): ICD-10-CM

## 2024-08-14 DIAGNOSIS — Z85.828 HISTORY OF NONMELANOMA SKIN CANCER: ICD-10-CM

## 2024-08-14 DIAGNOSIS — L57.0 AK (ACTINIC KERATOSIS): ICD-10-CM

## 2024-08-14 DIAGNOSIS — Z85.820 HISTORY OF MELANOMA: ICD-10-CM

## 2024-08-14 DIAGNOSIS — L57.8 OTHER SKIN CHANGES DUE TO CHRONIC EXPOSURE TO NONIONIZING RADIATION: Primary | ICD-10-CM

## 2024-08-14 PROCEDURE — 99213 OFFICE O/P EST LOW 20 MIN: CPT | Mod: 25,,, | Performed by: DERMATOLOGY

## 2024-08-14 PROCEDURE — 17003 DESTRUCT PREMALG LES 2-14: CPT | Mod: ,,, | Performed by: DERMATOLOGY

## 2024-08-14 PROCEDURE — 17000 DESTRUCT PREMALG LESION: CPT | Mod: ,,, | Performed by: DERMATOLOGY

## 2024-08-14 NOTE — PROGRESS NOTES
[facility]   Marianela Arias MD    Patient Name: Kleber Montoya  Patient YOB: 1945   Date of Service: 8/14/24    CC: Full Skin Exam    HPI: Kleber Montoya is a 79 y.o. male presents today for a full skin exam.  Patient was last seen 02/05/2024 and dermatologic history includes Melanoma, NMSC and AKs. Patient is not concerned with anything at this time.     History reviewed. No pertinent past medical history.  Past Surgical History:   Procedure Laterality Date    ABDOMINAL SURGERY       Review of patient's allergies indicates:  No Known Allergies    Current Outpatient Medications:     mupirocin (BACTROBAN) 2 % ointment, Apply topically once daily., Disp: 22 g, Rfl: 5    ROS: A focused review of systems was obtained and negative.     Exam: A full skin exam was performed including scalp, hair, face, neck, chest, back, abdomen, right arm, left arm, right hand, left hand, nails, right leg, and left leg.  All areas examined were normal expect as per below in assessment and plan.  General Appearance of the patient is well developed and well nourished.  Orientation: alert and oriented x 3.  Mood and affect: pleasant.    Assessment:   The primary encounter diagnosis was Other skin changes due to chronic exposure to nonionizing radiation. Diagnoses of SK (seborrheic keratosis), History of nonmelanoma skin cancer, History of melanoma, and AK (actinic keratosis) were also pertinent to this visit.    Plan:      Other Skin Changes Due to Chronic Exposure of Nonionizing Radiation (L57.8)    Plan: Monitoring.     Plan: Sunscreen Recommendations.  I recommended a broad spectrum sunscreen with a SPF of 30 or higher. I explained that SPF 30 sunscreens block approximately 97 percent of the  sun's harmful rays. Sunscreens should be applied at least 15 minutes prior to expected sun exposure and then every 2 hours after that as long as  sun exposure continues. If swimming or exercising sunscreen should be reapplied  every 45 minutes to an hour after getting wet or sweating. One  ounce, or the equivalent of a shot glass full of sunscreen, is adequate to protect the skin not covered by a bathing suit. I also recommended a lip  balm with a sunscreen as well. Sun protective clothing can be used in lieu of sunscreen but must be worn the entire time you are exposed to the  sun's rays.    Seborrheic Keratosis (L82.1)  - Stuck-on, warty, greasy brown papule with pseudo-horn cysts scattered on the trunk and extremities    Plan: Counseling.  I counseled the patient regarding the following:  Skin Care: Seborrheic Keratoses are benign. No treatment is necessary.  Expectations: Seborrheic Keratoses are benign warty growths. Patients get more of them as they age    Plan: Reassurance    History of non-melanoma skin cancer (Z85.828)  - Well healed scar with NER  Associated diagnosis: Medical surveillance following completed treatment    Plan: Monitoring.    History of malignant melanoma  - well healed scar with NER  Associated diagnosis: Medical surveillance following completed treatment    Plan: Counseling  I counseled the patient regarding the following:  Skin Care: Patients with a history of melanoma should wear broad spectrum sunscreen and sun protective clothing.  Expectations: Scars from excisional sites of melanoma should be monitored for any recurrences. Monthly self-skin checks should be performed to monitor for any moles that change in size, shape or color, itch burn or bleed.  Contact Office if: Patient notices any new or changing moles, develops constitutional symptoms or develops new lesions within or around the previous melanoma scar.    Actinic Keratoses(L57.0)  - Erythematous patches and papules with hyperkeratotic scale distributed on the left arm, left hand, left cheek, right hand,and right arm .    Plan: Counseling.  I counseled the patient regarding the following:  Skin Care: Sun protective clothing and broad spectrum  sunscreen can prevent the formation of Actinic  Keratoses. AKs can resolve with cryotherapy, photodynamic therapy, imiquimod, topical 5-FU.  Expectations: Actinic Keratoses are precancerous proliferations that occur within sun damaged skin. If untreated,  a small subset of AKs can develop into Squamous Cell Carcinoma.  Contact Office if: If AKs fail to resolve despite treatment, or if you develop a side effect from therapy, such as  unbearable crusting, scabbing, redness and tenderness.    Plan: Liquid Nitrogen.  A total of 8 lesions were treated with liquid nitrogen for 2 freeze-thaw cycles lasting 5 seconds, located on the above locations.   The patient's consent was obtained including but not limited to risks of crusting, scabbing,  blistering, scarring, darker or lighter pigmentary change, recurrence, incomplete removal and infection.          Follow up in about 6 months (around 2/14/2025) for fse.    Marianela Arias MD

## 2024-08-14 NOTE — PATIENT INSTRUCTIONS
Sunscreen Recommendations  I recommended a broad spectrum sunscreen with a SPF of 30 or higher that is water-resistant. SPF 30 sunscreens block approximately 97 percent of the sun's harmful rays.   Sunscreens should be applied at least 15 minutes prior to expected sun exposure and then every 90 minutes after that as long as sun exposure continues.   If swimming or exercising sunscreen should be reapplied every 45 minutes to an hour after getting wet or sweating.  One ounce, or the equivalent of a shot glass full of sunscreen, is adequate to protect the skin not covered by a bathing suit.   I also recommended a lip balm with a sunscreen as well.   Healthy Sun Protective Behaviors  Sun protective clothing can be used in lieu of sunscreen but must be worn the entire time you are exposed to the sun's rays.  Seek shade between 10 a.m. and 2 p.m.  Use extra caution near water, snow, or sand as they reflect sun rays  Avoid tanning beds and consider sunless self-tanning products instead  Perform monthly self skin exams    The ABCDEs of Melanoma  Asymmetry - when one side is unlike the other  Border - irregular  Color - different shades of colors that can be black, brown, tan, white, red, grey, or blue  Diameter - as big as or larger than the size of a pencil eraser (6mm)  Evolving - changing in size, color, or shape or stands out from the rest of your moles     Cryotherapy  There will likely be a blister.   Clean the area daily with dial antibacterial soap and water.   Apply vaseline as needed.   The area will take 1-2 weeks to heal.

## 2025-02-19 ENCOUNTER — OFFICE VISIT (OUTPATIENT)
Dept: DERMATOLOGY | Facility: CLINIC | Age: 80
End: 2025-02-19
Payer: MEDICARE

## 2025-02-19 DIAGNOSIS — Z85.820 HISTORY OF MELANOMA: ICD-10-CM

## 2025-02-19 DIAGNOSIS — D48.9 NEOPLASM OF UNCERTAIN BEHAVIOR: ICD-10-CM

## 2025-02-19 DIAGNOSIS — Z85.828 HISTORY OF NONMELANOMA SKIN CANCER: ICD-10-CM

## 2025-02-19 DIAGNOSIS — L57.8 OTHER SKIN CHANGES DUE TO CHRONIC EXPOSURE TO NONIONIZING RADIATION: Primary | ICD-10-CM

## 2025-02-19 DIAGNOSIS — L82.1 SK (SEBORRHEIC KERATOSIS): ICD-10-CM

## 2025-02-19 DIAGNOSIS — L57.0 AK (ACTINIC KERATOSIS): ICD-10-CM

## 2025-02-19 PROCEDURE — 88305 TISSUE EXAM BY PATHOLOGIST: CPT | Mod: 26,,, | Performed by: PATHOLOGY

## 2025-02-19 PROCEDURE — 88305 TISSUE EXAM BY PATHOLOGIST: CPT | Mod: TC,SUR | Performed by: DERMATOLOGY

## 2025-02-19 NOTE — PROGRESS NOTES
[facility]   Marianela Arias MD    Patient Name: Kleber Montoya  Patient YOB: 1945   Date of Service: 2/19/25    CC: Full Skin Exam    HPI: Kleber Montoya is a 79 y.o. male presents today for a full skin exam.  Patient was last seen 08/24 and dermatologic history includes melanoma, NMSC, and AKs. Patient is concerned today about a lesion located on the left cheel.  It has been present for 1 year(s). It has been treated in the past.      History reviewed. No pertinent past medical history.  Past Surgical History:   Procedure Laterality Date    ABDOMINAL SURGERY       Review of patient's allergies indicates:  No Known Allergies  Current Medications[1]    ROS: A focused review of systems was obtained and negative.     Exam: A full skin exam was performed including scalp, hair, face, neck, chest, back, abdomen, right arm, left arm, right hand, left hand, nails, right leg, and left leg.  All areas examined were normal expect as per below in assessment and plan.  General Appearance of the patient is well developed and well nourished.  Orientation: alert and oriented x 3.  Mood and affect: pleasant.    Assessment:   The primary encounter diagnosis was Other skin changes due to chronic exposure to nonionizing radiation. Diagnoses of SK (seborrheic keratosis), AK (actinic keratosis), Neoplasm of uncertain behavior, History of nonmelanoma skin cancer, and History of melanoma were also pertinent to this visit.    Plan:        Seborrheic Keratosis (L82.1)  - Stuck-on, warty, greasy brown papule with pseudo-horn cysts scattered on the trunk and extremities    Plan: Counseling.  I counseled the patient regarding the following:  Skin Care: Seborrheic Keratoses are benign. No treatment is necessary.  Expectations: Seborrheic Keratoses are benign warty growths. Patients get more of them as they age    Plan: Reassurance      Actinic Keratoses(L57.0)  - Erythematous patches and papules with hyperkeratotic scale  distributed on the left arm, forehead, right cheek, and left hand.    Plan: Counseling.  I counseled the patient regarding the following:  Skin Care: Sun protective clothing and broad spectrum sunscreen can prevent the formation of Actinic  Keratoses. AKs can resolve with cryotherapy, photodynamic therapy, imiquimod, topical 5-FU.  Expectations: Actinic Keratoses are precancerous proliferations that occur within sun damaged skin. If untreated,  a small subset of AKs can develop into Squamous Cell Carcinoma.  Contact Office if: If AKs fail to resolve despite treatment, or if you develop a side effect from therapy, such as  unbearable crusting, scabbing, redness and tenderness.    Plan: Liquid Nitrogen.  A total of 10 lesions were treated with liquid nitrogen for 2 freeze-thaw cycles lasting 5 seconds, located on the above locations.   The patient's consent was obtained including but not limited to risks of crusting, scabbing,  blistering, scarring, darker or lighter pigmentary change, recurrence, incomplete removal and infection.      Neoplasm of Uncertain Behavior (D48.5)  - Eroded papule located on the left apical triangle  Ddx includes: BCC    Plan: Counseling.  I counseled the patient regarding the following:  Instructions: Neoplasms of Uncertain Behavior can be observed, biopsied or surgically removed depending on the  level of clinical suspicion.  Instructions: Neoplasms of Uncertain Behavior can be observed, biopsied or surgically removed depending on the  level of clinical suspicion.  Contact Office if: patient develops any new lesions that fail to heal, ulcerate or bleed.    Plan: Biopsy by Shave Method.  Location (1): Left apical triangle  Written consent was obtained and risks were reviewed including but not  limited to scarring, infection, bleeding, scabbing, incomplete removal, nerve damage and allergy to anesthesia.  The area was prepped with Chloraprep. Local anesthesia was obtained with approximately  0.5cc of 1% lidocaine  with epinephrine. A biopsy by shave method to the level of the dermis (sent for H and E) was performed using  a Dermablade on the above location. Aluminum chloride was used for hemostasis. Following the biopsy  Petrolatum and a bandage were applied. Patient will be notified of biopsy results. However, patient instructed to  call the office if not contacted within 2 weeks.      History of non-melanoma skin cancer (Z85.828)  - Well healed scar with NER  Associated diagnosis: Medical surveillance following completed treatment    Plan: Monitoring.      History of malignant melanoma  - well healed scar with NER  Associated diagnosis: Medical surveillance following completed treatment    Plan: Counseling  I counseled the patient regarding the following:  Skin Care: Patients with a history of melanoma should wear broad spectrum sunscreen and sun protective clothing.  Expectations: Scars from excisional sites of melanoma should be monitored for any recurrences. Monthly self-skin checks should be performed to monitor for any moles that change in size, shape or color, itch burn or bleed.  Contact Office if: Patient notices any new or changing moles, develops constitutional symptoms or develops new lesions within or around the previous melanoma scar.      Other Skin Changes Due to Chronic Exposure of Nonionizing Radiation (L57.8)    Plan: Monitoring.     Plan: Sunscreen Recommendations.  I recommended a broad spectrum sunscreen with a SPF of 30 or higher. I explained that SPF 30 sunscreens block approximately 97 percent of the  sun's harmful rays. Sunscreens should be applied at least 15 minutes prior to expected sun exposure and then every 2 hours after that as long as  sun exposure continues. If swimming or exercising sunscreen should be reapplied every 45 minutes to an hour after getting wet or sweating. One  ounce, or the equivalent of a shot glass full of sunscreen, is adequate to protect the skin  not covered by a bathing suit. I also recommended a lip  balm with a sunscreen as well. Sun protective clothing can be used in lieu of sunscreen but must be worn the entire time you are exposed to the  sun's rays.    Follow up in about 6 months (around 8/19/2025) for FSE.    Marianela Arias MD           [1]   Current Outpatient Medications:     mupirocin (BACTROBAN) 2 % ointment, Apply topically once daily., Disp: 22 g, Rfl: 5

## 2025-02-19 NOTE — PATIENT INSTRUCTIONS
Sunscreen Recommendations  I recommended a broad spectrum sunscreen with a SPF of 30 or higher that is water-resistant. SPF 30 sunscreens block approximately 97 percent of the sun's harmful rays.   Sunscreens should be applied at least 15 minutes prior to expected sun exposure and then every 90 minutes after that as long as sun exposure continues.   If swimming or exercising sunscreen should be reapplied every 45 minutes to an hour after getting wet or sweating.  One ounce, or the equivalent of a shot glass full of sunscreen, is adequate to protect the skin not covered by a bathing suit.   I also recommended a lip balm with a sunscreen as well.   Healthy Sun Protective Behaviors  Sun protective clothing can be used in lieu of sunscreen but must be worn the entire time you are exposed to the sun's rays.  Seek shade between 10 a.m. and 2 p.m.  Use extra caution near water, snow, or sand as they reflect sun rays  Avoid tanning beds and consider sunless self-tanning products instead  Perform monthly self skin exams    The ABCDEs of Melanoma  Asymmetry - when one side is unlike the other  Border - irregular  Color - different shades of colors that can be black, brown, tan, white, red, grey, or blue  Diameter - as big as or larger than the size of a pencil eraser (6mm)  Evolving - changing in size, color, or shape or stands out from the rest of your moles       Cryotherapy  There will likely be a blister.   Clean the area daily with dial antibacterial soap and water.   Apply vaseline as needed.   The area will take 1-2 weeks to heal.      Biopsy Site Care  Starting tomorrow you may shower and wash the area with dial antibacterial soap  Pat dry and apply vaseline and a bandaid  Perform this routine for three days  The area will be irritated, sore, slightly red, and may itch, sting, or burn  Do not apply make-up to the area until it is healed  There will be a scar  The area will take 1-2 weeks to heal  No soaking in the tub  or hot tub for one week

## 2025-02-21 ENCOUNTER — RESULTS FOLLOW-UP (OUTPATIENT)
Dept: DERMATOLOGY | Facility: CLINIC | Age: 80
End: 2025-02-21

## 2025-02-21 LAB
DHEA SERPL-MCNC: NORMAL
ESTROGEN SERPL-MCNC: NORMAL PG/ML
INSULIN SERPL-ACNC: NORMAL U[IU]/ML
LAB AP GROSS DESCRIPTION: NORMAL
LAB AP LABORATORY NOTES: NORMAL
LAB AP SPEC A DDX: NORMAL
LAB AP SPEC A MORPHOLOGY: NORMAL
LAB AP SPEC A PROCEDURE: NORMAL
T3RU NFR SERPL: NORMAL %

## 2025-04-21 ENCOUNTER — OFFICE VISIT (OUTPATIENT)
Dept: DERMATOLOGY | Facility: CLINIC | Age: 80
End: 2025-04-21
Payer: MEDICARE

## 2025-04-21 VITALS — HEIGHT: 72 IN | WEIGHT: 210.13 LBS | BODY MASS INDEX: 28.46 KG/M2

## 2025-04-21 DIAGNOSIS — L82.1 SEBORRHEIC KERATOSES: Primary | ICD-10-CM

## 2025-04-21 DIAGNOSIS — L57.0 ACTINIC KERATOSES: ICD-10-CM

## 2025-04-21 NOTE — PROGRESS NOTES
[facility]   Marianela Arias MD    Patient Name: Kleber Montoya  Patient YOB: 1945   Date of Service: 4/21/25    CC: Follow-up Actinic Keratosis    HPI: Kleber Montoya is a 79 y.o. male here today for follow-up of AK, last seen 2/19/2025.  Previous treatments include biopsy.  Overall, the AK is stable.  Treatment plan was followed as directed.    History reviewed. No pertinent past medical history.  Past Surgical History:   Procedure Laterality Date    ABDOMINAL SURGERY       Review of patient's allergies indicates:  No Known Allergies  Current Medications[1]    ROS: A focused review of systems was obtained and negative.     Exam: A focused skin exam was performed. All areas examined were normal except as mentioned in the assessment and plan below.  General Appearance of the patient is well developed and well nourished.  Orientation: alert and oriented x 3.  Mood and affect: pleasant.    Assessment:   The primary encounter diagnosis was Seborrheic keratoses. A diagnosis of Actinic keratoses was also pertinent to this visit.    Plan:      Seborrheic Keratosis (L82.1)  - Stuck-on, warty, greasy brown papule with pseudo-horn cysts scattered on the trunk and extremities    Plan: Counseling.  I counseled the patient regarding the following:  Skin Care: Seborrheic Keratoses are benign. No treatment is necessary.  Expectations: Seborrheic Keratoses are benign warty growths. Patients get more of them as they age    Plan: Reassurance    Actinic Keratoses(L57.0)  - Erythematous patches and papules with hyperkeratotic scale distributed on the left cheek and right ear    Plan: Counseling.  I counseled the patient regarding the following:  Skin Care: Sun protective clothing and broad spectrum sunscreen can prevent the formation of Actinic  Keratoses. AKs can resolve with cryotherapy, photodynamic therapy, imiquimod, topical 5-FU.  Expectations: Actinic Keratoses are precancerous proliferations that occur  within sun damaged skin. If untreated,  a small subset of AKs can develop into Squamous Cell Carcinoma.  Contact Office if: If AKs fail to resolve despite treatment, or if you develop a side effect from therapy, such as  unbearable crusting, scabbing, redness and tenderness.    Plan: Liquid Nitrogen.  A total of 2 lesions were treated with liquid nitrogen for 2 freeze-thaw cycles lasting 5 seconds, located on the above locations.   The patient's consent was obtained including but not limited to risks of crusting, scabbing,  blistering, scarring, darker or lighter pigmentary change, recurrence, incomplete removal and infection.          Follow up if symptoms worsen or fail to improve.    Marianela Arias MD           [1]   Current Outpatient Medications:     mupirocin (BACTROBAN) 2 % ointment, Apply topically once daily., Disp: 22 g, Rfl: 5

## 2025-08-20 ENCOUNTER — OFFICE VISIT (OUTPATIENT)
Dept: DERMATOLOGY | Facility: CLINIC | Age: 80
End: 2025-08-20
Payer: MEDICARE

## 2025-08-20 DIAGNOSIS — L57.0 ACTINIC KERATOSIS: ICD-10-CM

## 2025-08-20 DIAGNOSIS — Z85.820 HISTORY OF MALIGNANT MELANOMA: ICD-10-CM

## 2025-08-20 DIAGNOSIS — L82.1 SEBORRHEIC KERATOSIS: ICD-10-CM

## 2025-08-20 DIAGNOSIS — L57.8 OTHER SKIN CHANGES DUE TO CHRONIC EXPOSURE TO NONIONIZING RADIATION: Primary | ICD-10-CM
